# Patient Record
Sex: MALE | Race: WHITE | NOT HISPANIC OR LATINO | Employment: FULL TIME | URBAN - METROPOLITAN AREA
[De-identification: names, ages, dates, MRNs, and addresses within clinical notes are randomized per-mention and may not be internally consistent; named-entity substitution may affect disease eponyms.]

---

## 2017-08-28 DIAGNOSIS — N40.1 ENLARGED PROSTATE WITH LOWER URINARY TRACT SYMPTOMS (LUTS): ICD-10-CM

## 2017-09-29 ENCOUNTER — ALLSCRIPTS OFFICE VISIT (OUTPATIENT)
Dept: OTHER | Facility: OTHER | Age: 67
End: 2017-09-29

## 2017-09-29 DIAGNOSIS — N40.1 ENLARGED PROSTATE WITH LOWER URINARY TRACT SYMPTOMS (LUTS): ICD-10-CM

## 2017-09-29 LAB
BILIRUB UR QL STRIP: NEGATIVE
CLARITY UR: NORMAL
COLOR UR: YELLOW
GLUCOSE (HISTORICAL): NEGATIVE
HGB UR QL STRIP.AUTO: NEGATIVE
KETONES UR STRIP-MCNC: NEGATIVE MG/DL
LEUKOCYTE ESTERASE UR QL STRIP: NEGATIVE
NITRITE UR QL STRIP: NEGATIVE
PH UR STRIP.AUTO: 5.5 [PH]
PROT UR STRIP-MCNC: NEGATIVE MG/DL
SP GR UR STRIP.AUTO: 1.02
UROBILINOGEN UR QL STRIP.AUTO: 0.2

## 2018-01-22 VITALS
HEIGHT: 73 IN | SYSTOLIC BLOOD PRESSURE: 130 MMHG | WEIGHT: 199 LBS | BODY MASS INDEX: 26.37 KG/M2 | DIASTOLIC BLOOD PRESSURE: 74 MMHG

## 2019-04-30 ENCOUNTER — OFFICE VISIT (OUTPATIENT)
Dept: UROLOGY | Facility: MEDICAL CENTER | Age: 69
End: 2019-04-30
Payer: COMMERCIAL

## 2019-04-30 VITALS
HEIGHT: 73 IN | DIASTOLIC BLOOD PRESSURE: 80 MMHG | SYSTOLIC BLOOD PRESSURE: 142 MMHG | BODY MASS INDEX: 26.37 KG/M2 | WEIGHT: 199 LBS | HEART RATE: 68 BPM

## 2019-04-30 DIAGNOSIS — R10.2 PERINEAL PAIN IN MALE: Primary | ICD-10-CM

## 2019-04-30 PROBLEM — M25.461 EFFUSION OF RIGHT PREPATELLAR BURSA: Status: ACTIVE | Noted: 2019-03-28

## 2019-04-30 PROBLEM — E73.9 LACTOSE INTOLERANCE IN ADULT: Status: ACTIVE | Noted: 2018-01-05

## 2019-04-30 PROBLEM — E55.9 VITAMIN D DEFICIENCY: Status: ACTIVE | Noted: 2019-03-28

## 2019-04-30 PROBLEM — N40.1 BENIGN PROSTATIC HYPERPLASIA WITH LOWER URINARY TRACT SYMPTOMS: Status: ACTIVE | Noted: 2017-08-28

## 2019-04-30 LAB
SL AMB  POCT GLUCOSE, UA: NORMAL
SL AMB LEUKOCYTE ESTERASE,UA: NORMAL
SL AMB POCT BILIRUBIN,UA: NORMAL
SL AMB POCT BLOOD,UA: NORMAL
SL AMB POCT CLARITY,UA: CLEAR
SL AMB POCT COLOR,UA: YELLOW
SL AMB POCT KETONES,UA: NORMAL
SL AMB POCT NITRITE,UA: NORMAL
SL AMB POCT PH,UA: 7.5
SL AMB POCT SPECIFIC GRAVITY,UA: 1.01
SL AMB POCT URINE PROTEIN: NORMAL
SL AMB POCT UROBILINOGEN: 0.2

## 2019-04-30 PROCEDURE — 81003 URINALYSIS AUTO W/O SCOPE: CPT | Performed by: UROLOGY

## 2019-04-30 PROCEDURE — 99213 OFFICE O/P EST LOW 20 MIN: CPT | Performed by: UROLOGY

## 2020-08-07 ENCOUNTER — TELEPHONE (OUTPATIENT)
Dept: UROLOGY | Facility: MEDICAL CENTER | Age: 70
End: 2020-08-07

## 2020-08-07 DIAGNOSIS — N40.1 BENIGN PROSTATIC HYPERPLASIA WITH LOWER URINARY TRACT SYMPTOMS, SYMPTOM DETAILS UNSPECIFIED: Primary | ICD-10-CM

## 2020-08-07 NOTE — TELEPHONE ENCOUNTER
Patient scheduled with Dr Chapo Schmitz for 1 year follow up  Does patient need psa if so please mail to patient

## 2020-10-29 ENCOUNTER — OFFICE VISIT (OUTPATIENT)
Dept: UROLOGY | Facility: MEDICAL CENTER | Age: 70
End: 2020-10-29
Payer: COMMERCIAL

## 2020-10-29 VITALS
HEART RATE: 68 BPM | BODY MASS INDEX: 25.86 KG/M2 | WEIGHT: 196 LBS | SYSTOLIC BLOOD PRESSURE: 122 MMHG | TEMPERATURE: 96.9 F | DIASTOLIC BLOOD PRESSURE: 58 MMHG

## 2020-10-29 DIAGNOSIS — N40.1 BENIGN PROSTATIC HYPERPLASIA WITH LOWER URINARY TRACT SYMPTOMS, SYMPTOM DETAILS UNSPECIFIED: Primary | ICD-10-CM

## 2020-10-29 DIAGNOSIS — Z12.5 ENCOUNTER FOR PROSTATE CANCER SCREENING: ICD-10-CM

## 2020-10-29 LAB
SL AMB  POCT GLUCOSE, UA: NORMAL
SL AMB LEUKOCYTE ESTERASE,UA: NORMAL
SL AMB POCT BILIRUBIN,UA: NORMAL
SL AMB POCT BLOOD,UA: NORMAL
SL AMB POCT CLARITY,UA: CLEAR
SL AMB POCT COLOR,UA: YELLOW
SL AMB POCT KETONES,UA: NORMAL
SL AMB POCT NITRITE,UA: NORMAL
SL AMB POCT PH,UA: 7.5
SL AMB POCT SPECIFIC GRAVITY,UA: 1
SL AMB POCT URINE PROTEIN: NORMAL
SL AMB POCT UROBILINOGEN: 0.2

## 2020-10-29 PROCEDURE — 81002 URINALYSIS NONAUTO W/O SCOPE: CPT | Performed by: UROLOGY

## 2020-10-29 PROCEDURE — 99213 OFFICE O/P EST LOW 20 MIN: CPT | Performed by: UROLOGY

## 2022-02-03 ENCOUNTER — OFFICE VISIT (OUTPATIENT)
Dept: UROLOGY | Facility: MEDICAL CENTER | Age: 72
End: 2022-02-03
Payer: COMMERCIAL

## 2022-02-03 VITALS
DIASTOLIC BLOOD PRESSURE: 70 MMHG | HEIGHT: 73 IN | WEIGHT: 192 LBS | SYSTOLIC BLOOD PRESSURE: 122 MMHG | HEART RATE: 66 BPM | BODY MASS INDEX: 25.45 KG/M2

## 2022-02-03 DIAGNOSIS — Z12.5 ENCOUNTER FOR PROSTATE CANCER SCREENING: Primary | ICD-10-CM

## 2022-02-03 PROCEDURE — 99213 OFFICE O/P EST LOW 20 MIN: CPT | Performed by: NURSE PRACTITIONER

## 2022-02-03 RX ORDER — CALCIUM CARBONATE 260MG(650)
TABLET,CHEWABLE ORAL
COMMUNITY

## 2022-02-03 RX ORDER — ASCORBIC ACID 100 MG
TABLET,CHEWABLE ORAL
COMMUNITY

## 2022-02-03 RX ORDER — CHOLECALCIFEROL (VITAMIN D3) 1250 MCG
CAPSULE ORAL
COMMUNITY

## 2022-02-03 NOTE — PROGRESS NOTES
2/3/2022      Chief Complaint   Patient presents with    Follow-up    Benign prostatic hyperplasia with lower urinary tract sympto     Assessment and Plan    80 y o  male managed by Dr Yeung     1  Prostate Cancer Screening  · PSA performed 07/31/2021 resulted 0 56, prior PSA 07/28/2020 resulted 0 5 white  · SOFIA-prostate size of approximately 35 g with no nodules noted  Smooth symmetrical   Nontender  · Repeat PSA 10/2022 and SOFIA in 1 year  · Follow-up in the office in 1 year    History of Present Illness  Claudetta Schilling is a 80 y o  male here for follow up evaluation of urinary symptoms secondary to benign prostatic hyperplasia  Patient also had routine prostate cancer screening  On evaluation the today as noted continue low-dose symptoms to consider M*Modal to urinate purulent he denies no suprapubic changes to his general health since prior office evaluation  Review of Systems   Constitutional: Negative for chills and fever  Respiratory: Negative for cough and shortness of breath  Cardiovascular: Negative for chest pain  Gastrointestinal: Negative for abdominal distention, abdominal pain, blood in stool, nausea and vomiting  Genitourinary: Negative for difficulty urinating, dysuria, enuresis, flank pain, frequency, hematuria and urgency  Skin: Negative for rash  AUA SYMPTOM SCORE      Most Recent Value   AUA SYMPTOM SCORE    How often have you had a sensation of not emptying your bladder completely after you finished urinating? 1   How often have you had to urinate again less than two hours after you finished urinating? 1   How often have you found you stopped and started again several times when you urinate? 0   How often have you found it difficult to postpone urination? 0   How often have you had a weak urinary stream? 0   How often have you had to push or strain to begin urination?  0   How many times did you most typically get up to urinate from the time you went to bed at night until the time you got up in the morning?  2   Quality of Life: If you were to spend the rest of your life with your urinary condition just the way it is now, how would you feel about that? 1   AUA SYMPTOM SCORE 4             Past Medical History  Past Medical History:   Diagnosis Date    BPH with obstruction/lower urinary tract symptoms     BPH without obstruction/lower urinary tract symptoms     Enlarged prostate     Frequency of micturition     Nocturia        Past Social History  Past Surgical History:   Procedure Laterality Date    ELBOW SURGERY       Social History     Tobacco Use   Smoking Status Never Smoker   Smokeless Tobacco Never Used       Past Family History  Family History   Problem Relation Age of Onset    Cancer Father        Past Social history  Social History     Socioeconomic History    Marital status: /Civil Union     Spouse name: Not on file    Number of children: Not on file    Years of education: Not on file    Highest education level: Not on file   Occupational History    Not on file   Tobacco Use    Smoking status: Never Smoker    Smokeless tobacco: Never Used   Vaping Use    Vaping Use: Never used   Substance and Sexual Activity    Alcohol use: Yes     Comment: wine with dinner    Drug use: Never    Sexual activity: Not on file   Other Topics Concern    Not on file   Social History Narrative    Not on file     Social Determinants of Health     Financial Resource Strain: Not on file   Food Insecurity: Not on file   Transportation Needs: Not on file   Physical Activity: Not on file   Stress: Not on file   Social Connections: Not on file   Intimate Partner Violence: Not on file   Housing Stability: Not on file       Current Medications  Current Outpatient Medications   Medication Sig Dispense Refill    Ascorbic Acid (Vitamin C) 100 MG CHEW Vitamin C      Cholecalciferol (Vitamin D3) 1 25 MG (32348 UT) CAPS Vitamin D3      ibuprofen (MOTRIN) 200 mg tablet Take 200 mg by mouth      Multiple Vitamin (multivitamin) tablet Take 1 tablet by mouth daily      Zinc 10 MG LOZG zinc      Cholecalciferol 2000 units CAPS Take 1 capsule by mouth daily (Patient not taking: Reported on 9/21/2021)      Sod Picosulfate-Mag Ox-Cit Acd (Clenpiq) 10-3 5-12 MG-GM -GM/160ML SOLN Take 1 kit by mouth see administration instructions Colonoscopy prep (Patient not taking: Reported on 2/3/2022 ) 1 mL 0     No current facility-administered medications for this visit  Allergies  No Known Allergies      The following portions of the patient's history were reviewed and updated as appropriate: allergies, current medications, past medical history, past social history, past surgical history and problem list       Vitals  Vitals:    02/03/22 1106   BP: 122/70   BP Location: Left arm   Patient Position: Sitting   Cuff Size: Adult   Pulse: 66   Weight: 87 1 kg (192 lb)   Height: 6' 1" (1 854 m)           Physical Exam  Physical Exam  Vitals reviewed  Constitutional:       General: He is not in acute distress  Appearance: Normal appearance  He is normal weight  HENT:      Head: Normocephalic  Pulmonary:      Effort: No respiratory distress  Breath sounds: Normal breath sounds  Genitourinary:     Comments: SOFIA-prostate 35 g with no nodules  Skin:     General: Skin is warm and dry  Neurological:      General: No focal deficit present  Mental Status: He is alert and oriented to person, place, and time  Psychiatric:         Mood and Affect: Mood normal          Behavior: Behavior normal            Results  No results found for this or any previous visit (from the past 1 hour(s))  ]  No results found for: PSA  No results found for: GLUCOSE, CALCIUM, NA, K, CO2, CL, BUN, CREATININE  No results found for: WBC, HGB, HCT, MCV, PLT        Orders  Orders Placed This Encounter   Procedures    PSA, Total Screen     This is a patient instruction: This test is non-fasting   Please drink two glasses of water morning of bloodwork          Standing Status:   Future     Standing Expiration Date:   8/3/2023       ODESSA Shields

## 2023-12-28 PROBLEM — R19.4 RECENT CHANGE IN FREQUENCY OF BOWEL MOVEMENTS: Status: ACTIVE | Noted: 2023-12-28

## 2024-04-22 ENCOUNTER — APPOINTMENT (OUTPATIENT)
Dept: RADIOLOGY | Facility: MEDICAL CENTER | Age: 74
End: 2024-04-22
Payer: COMMERCIAL

## 2024-04-22 DIAGNOSIS — M54.50 LOW BACK PAIN, UNSPECIFIED BACK PAIN LATERALITY, UNSPECIFIED CHRONICITY, UNSPECIFIED WHETHER SCIATICA PRESENT: ICD-10-CM

## 2024-04-22 PROCEDURE — 72110 X-RAY EXAM L-2 SPINE 4/>VWS: CPT
